# Patient Record
Sex: FEMALE | Race: WHITE | Employment: FULL TIME | URBAN - METROPOLITAN AREA
[De-identification: names, ages, dates, MRNs, and addresses within clinical notes are randomized per-mention and may not be internally consistent; named-entity substitution may affect disease eponyms.]

---

## 2018-04-30 ENCOUNTER — OFFICE VISIT (OUTPATIENT)
Dept: ORTHOPEDIC SURGERY | Age: 63
End: 2018-04-30

## 2018-04-30 VITALS
HEIGHT: 64 IN | HEART RATE: 97 BPM | BODY MASS INDEX: 29.02 KG/M2 | SYSTOLIC BLOOD PRESSURE: 124 MMHG | DIASTOLIC BLOOD PRESSURE: 83 MMHG | WEIGHT: 170 LBS

## 2018-04-30 DIAGNOSIS — M67.951 TENDINOPATHY OF RIGHT GLUTEUS MEDIUS: ICD-10-CM

## 2018-04-30 DIAGNOSIS — S82.842A BIMALLEOLAR ANKLE FRACTURE, LEFT, CLOSED, INITIAL ENCOUNTER: Primary | ICD-10-CM

## 2018-04-30 PROCEDURE — 99204 OFFICE O/P NEW MOD 45 MIN: CPT | Performed by: ORTHOPAEDIC SURGERY

## 2018-04-30 RX ORDER — TRAMADOL HYDROCHLORIDE 50 MG/1
50 TABLET ORAL EVERY 4 HOURS PRN
Qty: 42 TABLET | Refills: 0 | Status: SHIPPED | OUTPATIENT
Start: 2018-04-30 | End: 2018-05-09 | Stop reason: HOSPADM

## 2018-05-01 ENCOUNTER — TELEPHONE (OUTPATIENT)
Dept: ORTHOPEDIC SURGERY | Age: 63
End: 2018-05-01

## 2018-05-03 ENCOUNTER — TELEPHONE (OUTPATIENT)
Dept: ORTHOPEDIC SURGERY | Age: 63
End: 2018-05-03

## 2018-05-07 ENCOUNTER — TELEPHONE (OUTPATIENT)
Dept: ORTHOPEDIC SURGERY | Age: 63
End: 2018-05-07

## 2018-05-07 ASSESSMENT — PAIN DESCRIPTION - DESCRIPTORS: DESCRIPTORS: SHARP;DULL

## 2018-05-07 ASSESSMENT — PAIN - FUNCTIONAL ASSESSMENT: PAIN_FUNCTIONAL_ASSESSMENT: 0-10

## 2018-05-07 ASSESSMENT — PAIN SCALES - GENERAL: PAINLEVEL_OUTOF10: 1

## 2018-05-07 ASSESSMENT — PAIN DESCRIPTION - LOCATION: LOCATION: ANKLE

## 2018-05-07 ASSESSMENT — PAIN DESCRIPTION - PAIN TYPE: TYPE: ACUTE PAIN

## 2018-05-07 ASSESSMENT — PAIN DESCRIPTION - ORIENTATION: ORIENTATION: LEFT

## 2018-05-09 ENCOUNTER — HOSPITAL ENCOUNTER (OUTPATIENT)
Dept: PREADMISSION TESTING | Age: 63
Discharge: HOME | End: 2018-05-09
Attending: ORTHOPAEDIC SURGERY | Admitting: ORTHOPAEDIC SURGERY

## 2018-05-09 VITALS
HEIGHT: 64 IN | WEIGHT: 170 LBS | RESPIRATION RATE: 16 BRPM | TEMPERATURE: 98.9 F | OXYGEN SATURATION: 97 % | HEART RATE: 95 BPM | SYSTOLIC BLOOD PRESSURE: 123 MMHG | DIASTOLIC BLOOD PRESSURE: 82 MMHG | BODY MASS INDEX: 29.02 KG/M2

## 2018-05-09 DIAGNOSIS — Z87.81 STATUS POST ORIF OF FRACTURE OF ANKLE: ICD-10-CM

## 2018-05-09 DIAGNOSIS — G89.18 POST-OP PAIN: Primary | ICD-10-CM

## 2018-05-09 DIAGNOSIS — Z98.890 STATUS POST ORIF OF FRACTURE OF ANKLE: ICD-10-CM

## 2018-05-09 DIAGNOSIS — S82.843A CLOSED BIMALLEOLAR FRACTURE, UNSPECIFIED LATERALITY, INITIAL ENCOUNTER: ICD-10-CM

## 2018-05-09 LAB
ABO/RH: NORMAL
ANTIBODY SCREEN: NORMAL

## 2018-05-09 RX ORDER — ASPIRIN 81 MG/1
81 TABLET ORAL 2 TIMES DAILY
Qty: 28 TABLET | Refills: 0 | Status: SHIPPED | OUTPATIENT
Start: 2018-05-09 | End: 2018-05-30

## 2018-05-09 RX ORDER — KETOROLAC TROMETHAMINE 30 MG/ML
30 INJECTION, SOLUTION INTRAMUSCULAR; INTRAVENOUS ONCE
Status: COMPLETED | OUTPATIENT
Start: 2018-05-09 | End: 2018-05-09

## 2018-05-09 RX ORDER — LABETALOL HYDROCHLORIDE 5 MG/ML
5 INJECTION, SOLUTION INTRAVENOUS EVERY 10 MIN PRN
Status: DISCONTINUED | OUTPATIENT
Start: 2018-05-09 | End: 2018-05-10 | Stop reason: HOSPADM

## 2018-05-09 RX ORDER — DOCUSATE SODIUM 100 MG/1
100 CAPSULE, LIQUID FILLED ORAL 2 TIMES DAILY PRN
Qty: 28 CAPSULE | Refills: 0 | Status: SHIPPED | OUTPATIENT
Start: 2018-05-09 | End: 2018-05-30

## 2018-05-09 RX ORDER — ACETAMINOPHEN 500 MG
1000 TABLET ORAL ONCE
Status: COMPLETED | OUTPATIENT
Start: 2018-05-09 | End: 2018-05-09

## 2018-05-09 RX ORDER — HYDROCODONE BITARTRATE AND ACETAMINOPHEN 5; 325 MG/1; MG/1
2 TABLET ORAL EVERY 4 HOURS PRN
Status: DISCONTINUED | OUTPATIENT
Start: 2018-05-09 | End: 2018-05-10 | Stop reason: HOSPADM

## 2018-05-09 RX ORDER — SCOLOPAMINE TRANSDERMAL SYSTEM 1 MG/1
1 PATCH, EXTENDED RELEASE TRANSDERMAL ONCE
Status: DISCONTINUED | OUTPATIENT
Start: 2018-05-09 | End: 2018-05-10 | Stop reason: HOSPADM

## 2018-05-09 RX ORDER — SODIUM CHLORIDE 0.9 % (FLUSH) 0.9 %
10 SYRINGE (ML) INJECTION EVERY 12 HOURS SCHEDULED
Status: DISCONTINUED | OUTPATIENT
Start: 2018-05-09 | End: 2018-05-10 | Stop reason: HOSPADM

## 2018-05-09 RX ORDER — MORPHINE SULFATE 2 MG/ML
2 INJECTION, SOLUTION INTRAMUSCULAR; INTRAVENOUS EVERY 5 MIN PRN
Status: DISCONTINUED | OUTPATIENT
Start: 2018-05-09 | End: 2018-05-10 | Stop reason: HOSPADM

## 2018-05-09 RX ORDER — FENTANYL CITRATE 50 UG/ML
50 INJECTION, SOLUTION INTRAMUSCULAR; INTRAVENOUS EVERY 5 MIN PRN
Status: DISCONTINUED | OUTPATIENT
Start: 2018-05-09 | End: 2018-05-10 | Stop reason: HOSPADM

## 2018-05-09 RX ORDER — ROPIVACAINE HYDROCHLORIDE 5 MG/ML
INJECTION, SOLUTION EPIDURAL; INFILTRATION; PERINEURAL
Status: DISPENSED
Start: 2018-05-09 | End: 2018-05-09

## 2018-05-09 RX ORDER — HYDROCODONE BITARTRATE AND ACETAMINOPHEN 5; 325 MG/1; MG/1
1 TABLET ORAL EVERY 4 HOURS PRN
Status: DISCONTINUED | OUTPATIENT
Start: 2018-05-09 | End: 2018-05-10 | Stop reason: HOSPADM

## 2018-05-09 RX ORDER — KETOROLAC TROMETHAMINE 30 MG/ML
INJECTION, SOLUTION INTRAMUSCULAR; INTRAVENOUS
Status: DISCONTINUED
Start: 2018-05-09 | End: 2018-05-10 | Stop reason: HOSPADM

## 2018-05-09 RX ORDER — SODIUM CHLORIDE, SODIUM LACTATE, POTASSIUM CHLORIDE, CALCIUM CHLORIDE 600; 310; 30; 20 MG/100ML; MG/100ML; MG/100ML; MG/100ML
INJECTION, SOLUTION INTRAVENOUS CONTINUOUS
Status: DISCONTINUED | OUTPATIENT
Start: 2018-05-09 | End: 2018-05-10 | Stop reason: HOSPADM

## 2018-05-09 RX ORDER — SODIUM CHLORIDE 0.9 % (FLUSH) 0.9 %
10 SYRINGE (ML) INJECTION PRN
Status: DISCONTINUED | OUTPATIENT
Start: 2018-05-09 | End: 2018-05-10 | Stop reason: HOSPADM

## 2018-05-09 RX ORDER — FENTANYL CITRATE 50 UG/ML
100 INJECTION, SOLUTION INTRAMUSCULAR; INTRAVENOUS ONCE
Status: COMPLETED | OUTPATIENT
Start: 2018-05-09 | End: 2018-05-09

## 2018-05-09 RX ORDER — ONDANSETRON 2 MG/ML
4 INJECTION INTRAMUSCULAR; INTRAVENOUS
Status: ACTIVE | OUTPATIENT
Start: 2018-05-09 | End: 2018-05-09

## 2018-05-09 RX ORDER — HYDROCODONE BITARTRATE AND ACETAMINOPHEN 5; 325 MG/1; MG/1
1 TABLET ORAL EVERY 6 HOURS PRN
Qty: 28 TABLET | Refills: 0 | Status: SHIPPED | OUTPATIENT
Start: 2018-05-09 | End: 2018-05-16

## 2018-05-09 RX ORDER — MEPERIDINE HYDROCHLORIDE 25 MG/ML
12.5 INJECTION INTRAMUSCULAR; INTRAVENOUS; SUBCUTANEOUS EVERY 5 MIN PRN
Status: DISCONTINUED | OUTPATIENT
Start: 2018-05-09 | End: 2018-05-10 | Stop reason: HOSPADM

## 2018-05-09 RX ORDER — ONDANSETRON 2 MG/ML
4 INJECTION INTRAMUSCULAR; INTRAVENOUS EVERY 6 HOURS PRN
Status: DISCONTINUED | OUTPATIENT
Start: 2018-05-09 | End: 2018-05-10 | Stop reason: HOSPADM

## 2018-05-09 RX ORDER — ACETAMINOPHEN 325 MG/1
650 TABLET ORAL EVERY 4 HOURS PRN
Status: DISCONTINUED | OUTPATIENT
Start: 2018-05-09 | End: 2018-05-10 | Stop reason: HOSPADM

## 2018-05-09 RX ORDER — DOCUSATE SODIUM 100 MG/1
100 CAPSULE, LIQUID FILLED ORAL 2 TIMES DAILY
Status: DISCONTINUED | OUTPATIENT
Start: 2018-05-09 | End: 2018-05-10 | Stop reason: HOSPADM

## 2018-05-09 RX ORDER — MIDAZOLAM HYDROCHLORIDE 1 MG/ML
2 INJECTION INTRAMUSCULAR; INTRAVENOUS
Status: COMPLETED | OUTPATIENT
Start: 2018-05-09 | End: 2018-05-09

## 2018-05-09 RX ORDER — OXYCODONE HCL 20 MG/1
20 TABLET, FILM COATED, EXTENDED RELEASE ORAL ONCE
Status: COMPLETED | OUTPATIENT
Start: 2018-05-09 | End: 2018-05-09

## 2018-05-09 RX ADMIN — MIDAZOLAM HYDROCHLORIDE 2 MG: 1 INJECTION INTRAMUSCULAR; INTRAVENOUS at 07:45

## 2018-05-09 RX ADMIN — FENTANYL CITRATE 50 MCG: 50 INJECTION, SOLUTION INTRAMUSCULAR; INTRAVENOUS at 07:46

## 2018-05-09 RX ADMIN — OXYCODONE HCL 20 MG: 20 TABLET, FILM COATED, EXTENDED RELEASE ORAL at 07:19

## 2018-05-09 RX ADMIN — Medication 1000 MG: at 07:19

## 2018-05-09 RX ADMIN — SODIUM CHLORIDE, SODIUM LACTATE, POTASSIUM CHLORIDE, CALCIUM CHLORIDE: 600; 310; 30; 20 INJECTION, SOLUTION INTRAVENOUS at 07:18

## 2018-05-09 RX ADMIN — HYDROCODONE BITARTRATE AND ACETAMINOPHEN 2 TABLET: 5; 325 TABLET ORAL at 12:52

## 2018-05-09 RX ADMIN — FENTANYL CITRATE 50 MCG: 50 INJECTION, SOLUTION INTRAMUSCULAR; INTRAVENOUS at 12:54

## 2018-05-09 RX ADMIN — MORPHINE SULFATE 2 MG: 2 INJECTION, SOLUTION INTRAMUSCULAR; INTRAVENOUS at 12:18

## 2018-05-09 RX ADMIN — KETOROLAC TROMETHAMINE 30 MG: 30 INJECTION, SOLUTION INTRAMUSCULAR; INTRAVENOUS at 12:37

## 2018-05-09 RX ADMIN — HYDROCODONE BITARTRATE AND ACETAMINOPHEN 2 TABLET: 5; 325 TABLET ORAL at 16:40

## 2018-05-09 ASSESSMENT — PAIN - FUNCTIONAL ASSESSMENT: PAIN_FUNCTIONAL_ASSESSMENT: 0-10

## 2018-05-09 ASSESSMENT — PAIN SCALES - GENERAL
PAINLEVEL_OUTOF10: 4
PAINLEVEL_OUTOF10: 8
PAINLEVEL_OUTOF10: 7
PAINLEVEL_OUTOF10: 8
PAINLEVEL_OUTOF10: 7
PAINLEVEL_OUTOF10: 8
PAINLEVEL_OUTOF10: 8

## 2018-05-09 ASSESSMENT — PAIN DESCRIPTION - PAIN TYPE
TYPE: SURGICAL PAIN

## 2018-05-09 ASSESSMENT — PAIN DESCRIPTION - ORIENTATION
ORIENTATION: LEFT

## 2018-05-09 ASSESSMENT — PAIN DESCRIPTION - DESCRIPTORS
DESCRIPTORS: SHARP
DESCRIPTORS: SHARP;OTHER (COMMENT)
DESCRIPTORS: ACHING

## 2018-05-09 ASSESSMENT — PAIN DESCRIPTION - PROGRESSION: CLINICAL_PROGRESSION: GRADUALLY IMPROVING

## 2018-05-09 ASSESSMENT — PAIN DESCRIPTION - LOCATION
LOCATION: ANKLE;OTHER (COMMENT)
LOCATION: OTHER (COMMENT)
LOCATION: OTHER (COMMENT)

## 2018-05-09 ASSESSMENT — PAIN DESCRIPTION - FREQUENCY: FREQUENCY: CONTINUOUS

## 2018-05-10 ENCOUNTER — TELEPHONE (OUTPATIENT)
Dept: ORTHOPEDIC SURGERY | Age: 63
End: 2018-05-10

## 2018-05-14 ENCOUNTER — TELEPHONE (OUTPATIENT)
Dept: ORTHOPEDIC SURGERY | Age: 63
End: 2018-05-14

## 2018-05-15 ENCOUNTER — TELEPHONE (OUTPATIENT)
Dept: ORTHOPEDIC SURGERY | Age: 63
End: 2018-05-15

## 2018-05-21 ENCOUNTER — PRE-EVALUATION (OUTPATIENT)
Dept: ORTHOPEDIC SURGERY | Age: 63
End: 2018-05-21

## 2018-05-21 ENCOUNTER — OFFICE VISIT (OUTPATIENT)
Dept: ORTHOPEDIC SURGERY | Age: 63
End: 2018-05-21

## 2018-05-21 VITALS
DIASTOLIC BLOOD PRESSURE: 77 MMHG | HEART RATE: 92 BPM | BODY MASS INDEX: 29.02 KG/M2 | WEIGHT: 169.97 LBS | HEIGHT: 64 IN | SYSTOLIC BLOOD PRESSURE: 119 MMHG

## 2018-05-21 DIAGNOSIS — Z98.890 STATUS POST ORIF OF FRACTURE OF ANKLE: ICD-10-CM

## 2018-05-21 DIAGNOSIS — Z87.81 STATUS POST ORIF OF FRACTURE OF ANKLE: Primary | ICD-10-CM

## 2018-05-21 DIAGNOSIS — M25.572 LEFT ANKLE PAIN, UNSPECIFIED CHRONICITY: Primary | ICD-10-CM

## 2018-05-21 DIAGNOSIS — Z87.81 STATUS POST ORIF OF FRACTURE OF ANKLE: ICD-10-CM

## 2018-05-21 DIAGNOSIS — Z98.890 STATUS POST ORIF OF FRACTURE OF ANKLE: Primary | ICD-10-CM

## 2018-05-21 PROCEDURE — L4361 PNEUMA/VAC WALK BOOT PRE OTS: HCPCS | Performed by: ORTHOPAEDIC SURGERY

## 2018-05-21 PROCEDURE — APPSS15 APP SPLIT SHARED TIME 0-15 MINUTES: Performed by: PHYSICIAN ASSISTANT

## 2018-05-21 PROCEDURE — 99024 POSTOP FOLLOW-UP VISIT: CPT | Performed by: ORTHOPAEDIC SURGERY

## 2018-05-24 ENCOUNTER — TELEPHONE (OUTPATIENT)
Dept: ORTHOPEDIC SURGERY | Age: 63
End: 2018-05-24

## 2018-05-30 ENCOUNTER — OFFICE VISIT (OUTPATIENT)
Dept: ORTHOPEDIC SURGERY | Age: 63
End: 2018-05-30

## 2018-05-30 VITALS
HEIGHT: 64 IN | SYSTOLIC BLOOD PRESSURE: 133 MMHG | DIASTOLIC BLOOD PRESSURE: 85 MMHG | WEIGHT: 169 LBS | BODY MASS INDEX: 28.85 KG/M2 | HEART RATE: 103 BPM

## 2018-05-30 DIAGNOSIS — Z87.81 STATUS POST ORIF OF FRACTURE OF ANKLE: Primary | ICD-10-CM

## 2018-05-30 DIAGNOSIS — Z98.890 STATUS POST ORIF OF FRACTURE OF ANKLE: Primary | ICD-10-CM

## 2018-05-30 PROCEDURE — 99024 POSTOP FOLLOW-UP VISIT: CPT | Performed by: PHYSICIAN ASSISTANT

## 2018-06-11 ENCOUNTER — TELEPHONE (OUTPATIENT)
Dept: ORTHOPEDIC SURGERY | Age: 63
End: 2018-06-11

## 2018-06-19 ENCOUNTER — OFFICE VISIT (OUTPATIENT)
Dept: ORTHOPEDIC SURGERY | Age: 63
End: 2018-06-19

## 2018-06-19 VITALS
WEIGHT: 169 LBS | DIASTOLIC BLOOD PRESSURE: 79 MMHG | HEIGHT: 64 IN | HEART RATE: 86 BPM | SYSTOLIC BLOOD PRESSURE: 121 MMHG | BODY MASS INDEX: 28.85 KG/M2

## 2018-06-19 DIAGNOSIS — Z87.81 STATUS POST OPEN REDUCTION WITH INTERNAL FIXATION (ORIF) OF FRACTURE OF ANKLE: ICD-10-CM

## 2018-06-19 DIAGNOSIS — Z98.890 STATUS POST OPEN REDUCTION WITH INTERNAL FIXATION (ORIF) OF FRACTURE OF ANKLE: ICD-10-CM

## 2018-06-19 DIAGNOSIS — M25.572 LEFT ANKLE PAIN, UNSPECIFIED CHRONICITY: Primary | ICD-10-CM

## 2018-06-19 PROCEDURE — 99024 POSTOP FOLLOW-UP VISIT: CPT | Performed by: ORTHOPAEDIC SURGERY

## 2018-07-09 ENCOUNTER — OFFICE VISIT (OUTPATIENT)
Dept: ORTHOPEDIC SURGERY | Age: 63
End: 2018-07-09

## 2018-07-09 VITALS
SYSTOLIC BLOOD PRESSURE: 118 MMHG | WEIGHT: 169 LBS | DIASTOLIC BLOOD PRESSURE: 77 MMHG | HEIGHT: 64 IN | HEART RATE: 80 BPM | BODY MASS INDEX: 28.85 KG/M2

## 2018-07-09 DIAGNOSIS — M25.572 LEFT ANKLE PAIN, UNSPECIFIED CHRONICITY: Primary | ICD-10-CM

## 2018-07-09 DIAGNOSIS — Z98.890 STATUS POST OPEN REDUCTION WITH INTERNAL FIXATION (ORIF) OF FRACTURE OF ANKLE: ICD-10-CM

## 2018-07-09 DIAGNOSIS — Z87.81 STATUS POST OPEN REDUCTION WITH INTERNAL FIXATION (ORIF) OF FRACTURE OF ANKLE: ICD-10-CM

## 2018-07-09 PROCEDURE — 99024 POSTOP FOLLOW-UP VISIT: CPT | Performed by: ORTHOPAEDIC SURGERY

## 2018-07-12 ENCOUNTER — TELEPHONE (OUTPATIENT)
Dept: ORTHOPEDIC SURGERY | Age: 63
End: 2018-07-12

## 2018-07-12 NOTE — TELEPHONE ENCOUNTER
Insurance will not pay for PT and pt states Dr. Jaimie Schultz said he would intervene to get the insurance company to pay. She is needing the doctor's help.

## 2018-07-17 ENCOUNTER — HOSPITAL ENCOUNTER (OUTPATIENT)
Dept: PHYSICAL THERAPY | Age: 63
Discharge: HOME | End: 2018-07-31
Admitting: ORTHOPAEDIC SURGERY

## 2018-07-17 NOTE — PLAN OF CARE
her ankle feels better now that she is able to put weight down, but is still limping. Relevant Medical History: Pt denies any previous injuries/surgeries to L LE  Functional Disability Index:PT G-Codes  Functional Assessment Tool Used: LEFS  Score: 66%  Functional Limitation: Mobility: Walking and moving around  Mobility: Walking and Moving Around Current Status (): At least 60 percent but less than 80 percent impaired, limited or restricted  Mobility: Walking and Moving Around Goal Status (): At least 20 percent but less than 40 percent impaired, limited or restricted    Pain Scale: 0-10/10  Easing factors: boot, rest, ice, elevation  Provocative factors: walking, standing, going up/down steps     Type: []Constant   [x]Intermittent  []Radiating [x]Localized []other:     Numbness/Tingling: Pt reports having occasional N/T into toes    Occupation/School: Pt works as /desk job. Pt is currently on light duty (slightly different position in a different office building) because she needs to be able to walk back and forth quite a bit in the office, and she has 14 concrete steps down to her office with no elevator. Living Status/Prior Level of Function: Independent with ADLs and IADLs. Pt enjoys walking and kayaking.      OBJECTIVE:     ROM LEFT RIGHT   HIP Flex     HIP Abd     HIP Ext     HIP IR     HIP ER     Knee ext     Knee Flex     Ankle PF 20 40   Ankle DF -10 8   Ankle In 15 30   Ankle Ev 10 20   Strength  LEFT RIGHT   HIP Flexors     HIP Abductors     HIP Ext     Hip ER     Knee EXT (quad)     Knee Flex (HS)     Ankle DF NT 5   Ankle PF NT 5   Ankle Inv NT 5   Ankle EV NT 5        Circumference  Figure 8 ankle     39 cm    37.75 cm     Reflexes/Sensation:    [x]Dermatomes/Myotomes intact    [x]Reflexes equal and normal bilaterally   []Other:    Joint mobility: Talocrural   []Normal    [x]Hypo   []Hyper    Palpation: Generalized TTP due to recent sx    Functional Mobility/Transfers: Pt barriers              [x]Environmental, home barriers              [x]profession/work barriers  []past PT/medical experience  [x]other: insurance limitations  Justification:     Falls Risk Assessment (30 days):   [x] Falls Risk assessed and no intervention required. [] Falls Risk assessed and Patient requires intervention due to being higher risk   TUG score (>12s at risk):     [] Falls education provided, including       G-Codes:  PT G-Codes  Functional Assessment Tool Used: LEFS  Score: 66%  Functional Limitation: Mobility: Walking and moving around  Mobility: Walking and Moving Around Current Status (): At least 60 percent but less than 80 percent impaired, limited or restricted  Mobility: Walking and Moving Around Goal Status ():  At least 20 percent but less than 40 percent impaired, limited or restricted    ASSESSMENT:   Functional Impairments:     [x]Noted lumbar/proximal hip/LE joint hypomobility   [x]Decreased LE functional ROM   [x]Decreased core/proximal hip strength and neuromuscular control   [x]Decreased LE functional strength   [x]Reduced balance/proprioceptive control   []other:      Functional Activity Limitations (from functional questionnaire and intake)   []Reduced ability to tolerate prolonged functional positions   [x]Reduced ability or difficulty with changes of positions or transfers between positions   []Reduced ability to maintain good posture and demonstrate good body mechanics with sitting, bending, and lifting   []Reduced ability to sleep   [] Reduced ability or tolerance with driving and/or computer work   []Reduced ability to perform lifting, carrying tasks   [x]Reduced ability to squat   []Reduced ability to forward bend   [x]Reduced ability to ambulate prolonged functional periods/distances/surfaces   [x]Reduced ability to ascend/descend stairs   [x]Reduced ability to run, hop, cut or jump   []other:    Participation Restrictions   []Reduced participation in self care assessment of functional outcome. [x] EVAL (LOW) 30945 (typically 30 minutes face-to-face)  [] EVAL (MOD) 71590 (typically 30 minutes face-to-face)  [] EVAL (HIGH) 01449 (typically 45 minutes face-to-face)  [] RE-EVAL     PLAN:   Frequency/Duration:  1 days per week for 8-10 Weeks (due to insurance limitations): Interventions:  [x]  Therapeutic exercise including: strength training, ROM, for Lower extremity and core   [x]  NMR activation and proprioception for LE, Glutes and Core   [x]  Manual therapy as indicated for LE, Hip and spine to include: Dry Needling/IASTM, STM, PROM, Gr I-IV mobilizations, manipulation. [x] Modalities as needed that may include: thermal agents, E-stim, Biofeedback, US, iontophoresis as indicated  [x] Patient education on joint protection, postural re-education, activity modification, progression of HEP. HEP instruction: Patient instructed in, and demonstrated proper form of, exercises. Copy of exercises scanned into media file. GOALS:  Patient stated goal: \"To be able to walk better\"     Therapist goals for Patient:   Short Term Goals: To be achieved in: 2 weeks  1. Independent in HEP and progression per patient tolerance, in order to prevent re-injury. 2. Patient will have a decrease in pain to facilitate improvement in movement, function, and ADLs as indicated by Functional Deficits. Long Term Goals: To be achieved in: 8-10 weeks  1. Disability index score of 40% or less for the LEFS to assist with reaching prior level of function. 2. Patient will demonstrate increased AROM to WNL for L ankle to allow for proper joint functioning as indicated by patients Functional Deficits. 3. Patient will demonstrate an increase in Strength to good proximal hip strength and control, within 5lb HHD in LE to allow for proper functional mobility as indicated by patients Functional Deficits.    4. Patient will return to walking up and down 1 flight of stairs without increased symptoms or restriction in order to return to normal work duties. 5. Patient will be able to walk for 30 minutes without increased symptoms or restriction.         Electronically signed by:  Valarie Vail PT

## 2018-07-17 NOTE — FLOWSHEET NOTE
[facility] [address], [facility]    Physical Therapy Daily Treatment Note  Date:  2018    Patient Name:  Roopa Matute    :  1955  MRN: [id number]  Restrictions/Precautions:    Medical/Treatment Diagnosis Information:  · Diagnosis: Left ankle pain (M25.572), s/p ORIF of medial and lateral malleolus (Z96.7) 18  · Treatment Diagnosis: Left ankle pain   Insurance/Certification information:  PT Insurance Information: Self pay  Physician Information:  Referring Practitioner: Dr. Jammie Dillard of care signed (Y/N):     Date of Patient follow up with Physician:     G-Code (if applicable):      Date G-Code Applied:    PT G-Codes  Functional Assessment Tool Used: LEFS  Score: 66%  Functional Limitation: Mobility: Walking and moving around  Mobility: Walking and Moving Around Current Status (): At least 60 percent but less than 80 percent impaired, limited or restricted  Mobility: Walking and Moving Around Goal Status ():  At least 20 percent but less than 40 percent impaired, limited or restricted    Progress Note: [x]  Yes  []  No  Next due by: Visit #10       Latex Allergy:  [x]NO      []YES  Preferred Language for Healthcare:   [x]English       []other:    Visit # Insurance Allowable   1 Self-pay     Pain level:  0-10/10     SUBJECTIVE:  See eval    OBJECTIVE: See eval  Observation:   Test measurements:      RESTRICTIONS/PRECAUTIONS: S/p R ankle ORIF medial & lateral malleolus 18     Exercises/Interventions:     Therapeutic Ex Resistance Sets/sec Reps Notes   Retro Stepper/BIKE       Sportcord March       SLR flex/abd  2 10    SAQ       Clam ABD       Hip Ext /table       Bosu fwd/side lunge       Slide Lunge       Leg Press Ecc 0-       Cybex HS curl       TKE       Glute side walks       BOSU squat       Seated calf stretch  30\" 3    Ankle AROM  2 10 DF/PF, IV/EV   Ankle circles  2 10 CW, CCW   Ankle ABC's  1 2x    Towel scrunches  2 10 Towards Functional goals:  [] Patient is progressing as expected towards functional goals listed. [] Progression is slowed due to complexities listed. [] Progression has been slowed due to co-morbidities.   [x] Plan just implemented, too soon to assess goals progression  [] Other:     ASSESSMENT:  See eval    Treatment/Activity Tolerance:  [x] Patient tolerated treatment well [] Patient limited by fatique  [] Patient limited by pain  [] Patient limited by other medical complications  [] Other:     Prognosis: [x] Good [] Fair  [] Poor    Patient Requires Follow-up: [x] Yes  [] No    PLAN: See eval  [] Continue per plan of care [] Alter current plan (see comments)  [x] Plan of care initiated [] Hold pending MD visit [] Discharge    Electronically signed by: Denver Bunk PT

## 2018-07-26 ENCOUNTER — HOSPITAL ENCOUNTER (OUTPATIENT)
Dept: PHYSICAL THERAPY | Age: 63
Discharge: HOME | End: 2018-07-27
Admitting: ORTHOPAEDIC SURGERY

## 2018-07-26 NOTE — FLOWSHEET NOTE
Oscillations-Mobs:  G-I, II, III, IV (PA's, Inf., Post.)  [] (19136) Provided manual therapy to mobilize LE, proximal hip and/or LS spine soft tissue/joints for the purpose of modulating pain, promoting relaxation,  increasing ROM, reducing/eliminating soft tissue swelling/inflammation/restriction, improving soft tissue extensibility and allowing for proper ROM for normal function with self care, mobility, lifting and ambulation. Modalities:  15' vaso    Charges:  Timed Code Treatment Minutes: 22   Total Treatment Minutes: 22     [] EVAL  [] VU(34064) x      [] IONTO  [x] NMR (23385) x      [] VASO  [] Manual (39887) x       [] Other:  [] TA x       [] Mech Traction (41221)  [] ES(attended) (73182)      [] ES (un) (81088):     GOALS:  Patient stated goal: \"To be able to walk better\"      Therapist goals for Patient:   Short Term Goals: To be achieved in: 2 weeks  1. Independent in HEP and progression per patient tolerance, in order to prevent re-injury. 2. Patient will have a decrease in pain to facilitate improvement in movement, function, and ADLs as indicated by Functional Deficits.     Long Term Goals: To be achieved in: 8-10 weeks  1. Disability index score of 40% or less for the LEFS to assist with reaching prior level of function. 2. Patient will demonstrate increased AROM to WNL for L ankle to allow for proper joint functioning as indicated by patients Functional Deficits. 3. Patient will demonstrate an increase in Strength to good proximal hip strength and control, within 5lb HHD in LE to allow for proper functional mobility as indicated by patients Functional Deficits. 4. Patient will return to walking up and down 1 flight of stairs without increased symptoms or restriction in order to return to normal work duties. 5. Patient will be able to walk for 30 minutes without increased symptoms or restriction.         Progression Towards Functional goals:  [x] Patient is progressing as expected

## 2018-07-31 ENCOUNTER — OFFICE VISIT (OUTPATIENT)
Dept: ORTHOPEDIC SURGERY | Age: 63
End: 2018-07-31

## 2018-07-31 VITALS
BODY MASS INDEX: 28.85 KG/M2 | HEIGHT: 64 IN | SYSTOLIC BLOOD PRESSURE: 107 MMHG | WEIGHT: 169 LBS | HEART RATE: 80 BPM | DIASTOLIC BLOOD PRESSURE: 74 MMHG

## 2018-07-31 DIAGNOSIS — M25.572 LEFT ANKLE PAIN, UNSPECIFIED CHRONICITY: Primary | ICD-10-CM

## 2018-07-31 DIAGNOSIS — S82.892D CLOSED FRACTURE OF LEFT ANKLE WITH ROUTINE HEALING, SUBSEQUENT ENCOUNTER: ICD-10-CM

## 2018-07-31 PROCEDURE — 99024 POSTOP FOLLOW-UP VISIT: CPT | Performed by: ORTHOPAEDIC SURGERY

## 2018-07-31 NOTE — PROGRESS NOTES
If so, please bring any errors to my attention for an addendum. All efforts were made to ensure that this office note is accurate.

## 2018-08-01 ENCOUNTER — HOSPITAL ENCOUNTER (OUTPATIENT)
Dept: PHYSICAL THERAPY | Age: 63
Discharge: HOME | End: 2018-08-31
Attending: ORTHOPAEDIC SURGERY | Admitting: ORTHOPAEDIC SURGERY

## 2018-08-02 ENCOUNTER — HOSPITAL ENCOUNTER (OUTPATIENT)
Dept: PHYSICAL THERAPY | Age: 63
Discharge: HOME | End: 2018-08-03
Admitting: ORTHOPAEDIC SURGERY

## 2018-08-02 NOTE — FLOWSHEET NOTE
[facility] , [facility]    Physical Therapy Daily Treatment Note  Date:  2018    Patient Name:  Heri Morataya    :  1955  MRN: [id number]  Restrictions/Precautions:    Medical/Treatment Diagnosis Information:  · Diagnosis: Left ankle pain (M25.572), s/p ORIF of medial and lateral malleolus (Z96.7) 18  · Treatment Diagnosis: Left ankle pain   Insurance/Certification information:  PT Insurance Information: Self pay  Physician Information:  Referring Practitioner: Dr. Wisam Perez of care signed (Y/N):     Date of Patient follow up with Physician:     G-Code (if applicable):      Date G-Code Applied:         Progress Note: [x]  Yes  []  No  Next due by: Visit #10       Latex Allergy:  [x]NO      []YES  Preferred Language for Healthcare:   [x]English       []other:    Visit # Insurance Allowable   1 Self-pay     Pain level:  0-10/10     SUBJECTIVE:  Has felt really good recently in the foot and ankle. Says that she has started to have increased pain in the anterior aspect of the knee due to the boot. Says that Dr. Joselito Braden said she can start to wean out of the boot in 2-3 weeks.      OBJECTIVE: See eval  Observation:   Test measurements:      RESTRICTIONS/PRECAUTIONS: S/p R ankle ORIF medial & lateral malleolus 18     Exercises/Interventions:     Therapeutic Ex Resistance Sets/sec Reps Notes   Retro Stepper/BIKE       Sportcord March       SLR flex/abd  2 10    SAQ       Clam ABD       Hip Ext /table       Bosu fwd/side lunge       Slide Lunge       Leg Press Ecc 0-       Cybex HS curl       TKE       Glute side walks       TM push  3'     Standing calf stretch  3'     Ankle AROM For proprioception and control 2 10 DF/PF, IV/EV   Ankle circles For proprioception and control 2 10 CW, CCW   Ankle ABC's  1 2x    Towel scrunches  2 10    Ankle Tband Blue 1 20                  Manual Intervention       Knee mobs/PROM       Tib/Fem Mobs

## 2018-08-07 ENCOUNTER — HOSPITAL ENCOUNTER (OUTPATIENT)
Dept: PHYSICAL THERAPY | Age: 63
Discharge: HOME | End: 2018-08-08
Admitting: ORTHOPAEDIC SURGERY

## 2018-08-07 NOTE — FLOWSHEET NOTE
[facility] , [facility]    Physical Therapy Daily Treatment Note  Date:  2018    Patient Name:  Anselm Sicard    :  1955  MRN: [id number]  Restrictions/Precautions:    Medical/Treatment Diagnosis Information:  · Diagnosis: Left ankle pain (M25.572), s/p ORIF of medial and lateral malleolus (Z96.7) 18  · Treatment Diagnosis: Left ankle pain   Insurance/Certification information:  PT Insurance Information: Self pay  Physician Information:  Referring Practitioner: Dr. Norbert Billings of care signed (Y/N):     Date of Patient follow up with Physician:     G-Code (if applicable):      Date G-Code Applied:         Progress Note: [x]  Yes  []  No  Next due by: Visit #10       Latex Allergy:  [x]NO      []YES  Preferred Language for Healthcare:   [x]English       []other:    Visit # Insurance Allowable   4 Self-pay     Pain level:  0-10/10     SUBJECTIVE:  Says that she has still felt pretty good overall, Says that she has noted some increased soreness in the front of the foot and says that she has been using the ankle more.     OBJECTIVE: See eval  Observation:   Test measurements:      RESTRICTIONS/PRECAUTIONS: S/p R ankle ORIF medial & lateral malleolus 18     Exercises/Interventions:     Therapeutic Ex Resistance Sets/sec Reps Notes   Retro Stepper/BIKE       Sportcord March       SLR flex/abd  2 10    SAQ       Clam ABD       Hip Ext /table       Bosu fwd/side lunge       Slide Lunge       Leg Press Ecc 0-       Cybex HS curl       TKE       Glute side walks       TM push  3'     Standing calf stretch  3'     Ankle AROM For proprioception and control 2 10 DF/PF, IV/EV   Ankle circles For proprioception and control 2 10 CW, CCW   Ankle ABC's  1 2x    Towel scrunches  2 10    Ankle Tband Blue 1 20                  Manual Intervention       Knee mobs/PROM       Tib/Fem Mobs       Patella Mobs       Ankle mobs/PROM 5' Greenlandic/Biofeedback 10/10       G. Med activaiton/sidelying       G. Max Activation/prone       Hip Ext full ROM G. Activation       Bosu Bal and Prop- G Med       Single leg stance/Balance/Prop       Bosu Retro G. Med act                         Therapeutic Exercise and NMR EXR  [x] (34045) Provided verbal/tactile cueing for activities related to strengthening, flexibility, endurance, ROM for improvements in LE, proximal hip, and core control with self care, mobility, lifting, ambulation.  [] (90540) Provided verbal/tactile cueing for activities related to improving balance, coordination, kinesthetic sense, posture, motor skill, proprioception  to assist with LE, proximal hip, and core control in self care, mobility, lifting, ambulation and eccentric single leg control.      NMR and Therapeutic Activities:    [] (09525 or 46217) Provided verbal/tactile cueing for activities related to improving balance, coordination, kinesthetic sense, posture, motor skill, proprioception and motor activation to allow for proper function of core, proximal hip and LE with self care and ADLs  [] (42666) Gait Re-education- Provided training and instruction to the patient for proper LE, core and proximal hip recruitment and positioning and eccentric body weight control with ambulation re-education including up and down stairs     Home Exercise Program:    [x] (13531) Reviewed/Progressed HEP activities related to strengthening, flexibility, endurance, ROM of core, proximal hip and LE for functional self-care, mobility, lifting and ambulation/stair navigation   [] (12343)Reviewed/Progressed HEP activities related to improving balance, coordination, kinesthetic sense, posture, motor skill, proprioception of core, proximal hip and LE for self care, mobility, lifting, and ambulation/stair navigation      Manual Treatments:  PROM / STM / Oscillations-Mobs:  G-I, II, III, IV (PA's, Inf., Post.)  [] (81045) Provided manual therapy to mobilize LE, proximal hip and/or LS spine soft tissue/joints for the purpose of modulating pain, promoting relaxation,  increasing ROM, reducing/eliminating soft tissue swelling/inflammation/restriction, improving soft tissue extensibility and allowing for proper ROM for normal function with self care, mobility, lifting and ambulation. Modalities:  15' vaso    Charges:  Timed Code Treatment Minutes: 22   Total Treatment Minutes: 22     [] EVAL  [] ZJ(09955) x      [] IONTO  [x] NMR (81474) x      [] VASO  [] Manual (51220) x  1    [] Other:  [] TA x       [] Mech Traction (30381)  [] ES(attended) (39339)      [] ES (un) (19457):     GOALS:  Patient stated goal: \"To be able to walk better\"      Therapist goals for Patient:   Short Term Goals: To be achieved in: 2 weeks  1. Independent in HEP and progression per patient tolerance, in order to prevent re-injury. 2. Patient will have a decrease in pain to facilitate improvement in movement, function, and ADLs as indicated by Functional Deficits.     Long Term Goals: To be achieved in: 8-10 weeks  1. Disability index score of 40% or less for the LEFS to assist with reaching prior level of function. 2. Patient will demonstrate increased AROM to WNL for L ankle to allow for proper joint functioning as indicated by patients Functional Deficits. 3. Patient will demonstrate an increase in Strength to good proximal hip strength and control, within 5lb HHD in LE to allow for proper functional mobility as indicated by patients Functional Deficits. 4. Patient will return to walking up and down 1 flight of stairs without increased symptoms or restriction in order to return to normal work duties. 5. Patient will be able to walk for 30 minutes without increased symptoms or restriction. Progression Towards Functional goals:  [x] Patient is progressing as expected towards functional goals listed. [] Progression is slowed due to complexities listed.   [] Progression has been slowed due to co-morbidities.   [] Plan just implemented, too soon to assess goals progression  [] Other:     ASSESSMENT:  See eval    Treatment/Activity Tolerance:  [x] Patient tolerated treatment well [] Patient limited by fatique  [] Patient limited by pain  [] Patient limited by other medical complications  [] Other:     Prognosis: [x] Good [] Fair  [] Poor    Patient Requires Follow-up: [x] Yes  [] No    PLAN: See eval  [] Continue per plan of care [] Alter current plan (see comments)  [x] Plan of care initiated [] Hold pending MD visit [] Discharge    Electronically signed by: Guicho Romero PT

## 2018-08-17 ENCOUNTER — HOSPITAL ENCOUNTER (OUTPATIENT)
Dept: PHYSICAL THERAPY | Age: 63
Discharge: HOME | End: 2018-08-18
Admitting: ORTHOPAEDIC SURGERY

## 2018-08-17 NOTE — FLOWSHEET NOTE
[facility] [address], [facility]    Physical Therapy Daily Treatment Note  Date:  2018    Patient Name:  Ramiro Lozano    :  1955  MRN: [id number]  Restrictions/Precautions:    Medical/Treatment Diagnosis Information:  · Diagnosis: Left ankle pain (M25.572), s/p ORIF of medial and lateral malleolus (Z96.7) 18  · Treatment Diagnosis: Left ankle pain   Insurance/Certification information:  PT Insurance Information: Self pay  Physician Information:  Referring Practitioner: Dr. Teressa Hernandez of care signed (Y/N):     Date of Patient follow up with Physician:     G-Code (if applicable):      Date G-Code Applied:         Progress Note: [x]  Yes  []  No  Next due by: Visit #10       Latex Allergy:  [x]NO      []YES  Preferred Language for Healthcare:   [x]English       []other:    Visit # Insurance Allowable   5 Self-pay     Pain level:  2/10     SUBJECTIVE:  Pt reports that her ankle has been feeling better. Pt states that her ankle still feels stiff and sore on the top, but feels a little better every day. Pt states that she has been wearing gym shoe around the house, but wearing boot outside of the house up to this point.         OBJECTIVE: See eval  Observation:   Test measurements:      RESTRICTIONS/PRECAUTIONS: S/p R ankle ORIF medial & lateral malleolus 18     Exercises/Interventions:     Therapeutic Ex Resistance Sets/sec Reps Notes   Bike  6'            SLR flex/abd  2 10    SAQ       Clam ABD       Hip Ext /table       Bosu fwd/side lunge       Slide Lunge       Leg Press Ecc 0-       Cybex HS curl       TKE       Glute side walks       TM push  3'     Standing calf stretch  3'     Ankle AROM For proprioception and control 2 10 DF/PF, IV/EV   Ankle circles For proprioception and control 2 10 CW, CCW   Ankle ABC's  1 2x    Towel scrunches  2 10    Ankle Tband Blue 1 20    BAPS board For proprioception and control 1 20 DF/PF, and ambulation/stair navigation      Manual Treatments:  PROM / STM / Oscillations-Mobs:  G-I, II, III, IV (PA's, Inf., Post.)  [] (27846) Provided manual therapy to mobilize LE, proximal hip and/or LS spine soft tissue/joints for the purpose of modulating pain, promoting relaxation,  increasing ROM, reducing/eliminating soft tissue swelling/inflammation/restriction, improving soft tissue extensibility and allowing for proper ROM for normal function with self care, mobility, lifting and ambulation. Modalities:  15' vaso    Charges:  Timed Code Treatment Minutes: 22   Total Treatment Minutes: 22     [] EVAL  [] OS(22910) x      [] IONTO  [x] NMR (61355) x      [] VASO  [] Manual (67457) x  1    [] Other:  [] TA x       [] Mech Traction (80466)  [] ES(attended) (80605)      [] ES (un) (42390):     GOALS:  Patient stated goal: \"To be able to walk better\"      Therapist goals for Patient:   Short Term Goals: To be achieved in: 2 weeks  1. Independent in HEP and progression per patient tolerance, in order to prevent re-injury. 2. Patient will have a decrease in pain to facilitate improvement in movement, function, and ADLs as indicated by Functional Deficits.     Long Term Goals: To be achieved in: 8-10 weeks  1. Disability index score of 40% or less for the LEFS to assist with reaching prior level of function. 2. Patient will demonstrate increased AROM to WNL for L ankle to allow for proper joint functioning as indicated by patients Functional Deficits. 3. Patient will demonstrate an increase in Strength to good proximal hip strength and control, within 5lb HHD in LE to allow for proper functional mobility as indicated by patients Functional Deficits. 4. Patient will return to walking up and down 1 flight of stairs without increased symptoms or restriction in order to return to normal work duties. 5. Patient will be able to walk for 30 minutes without increased symptoms or restriction.         Progression

## 2018-08-23 ENCOUNTER — HOSPITAL ENCOUNTER (OUTPATIENT)
Dept: PHYSICAL THERAPY | Age: 63
Discharge: HOME | End: 2018-08-24
Admitting: ORTHOPAEDIC SURGERY

## 2018-08-23 NOTE — FLOWSHEET NOTE
[place] [facility]    Physical Therapy Daily Treatment Note  Date:  2018    Patient Name:  Robert Herman    :  1955  MRN: [id number]  Restrictions/Precautions:    Medical/Treatment Diagnosis Information:  · Diagnosis: Left ankle pain (M25.572), s/p ORIF of medial and lateral malleolus (Z96.7) 18  · Treatment Diagnosis: Left ankle pain   Insurance/Certification information:  PT Insurance Information: Self pay  Physician Information:  Referring Practitioner: Dr. Sonja Luo of care signed (Y/N):     Date of Patient follow up with Physician:     G-Code (if applicable):      Date G-Code Applied:         Progress Note: [x]  Yes  []  No  Next due by: Visit #10       Latex Allergy:  [x]NO      []YES  Preferred Language for Healthcare:   [x]English       []other:    Visit # Insurance Allowable   6 Self-pay     Pain level:  1/10     SUBJECTIVE:  Pt reports that her ankle has been sore from walking out of the boot more, but feels better to move it as well. Pt states that she has been wearing boot when her ankle is sore and for longer distances, but otherwise wearing gym shoes.        OBJECTIVE: See eval  Observation:   Test measurements:  18: L ankle AROM: DF = 4, PF = 30, IV = 20, EV = 15      RESTRICTIONS/PRECAUTIONS: S/p R ankle ORIF medial & lateral malleolus 18     Exercises/Interventions:     Therapeutic Ex Resistance Sets/sec Reps Notes   Bike  6'            SLR flex/abd  2 10    SAQ       Clam ABD       Hip Ext /table       Bosu fwd/side lunge       Slide Lunge       Leg Press Ecc 0-       Cybex HS curl       TKE       Glute side walks       TM push  3'     Standing calf stretch  3'     Ankle AROM For proprioception and control 2 10 DF/PF, IV/EV   Ankle circles For proprioception and control 2 10 CW, CCW   Ankle ABC's  1 2x    Towel scrunches  2 10    Ankle Tband Blue 1 20    BAPS board For proprioception and control 1 20 DF/PF, IV/EV, CW, CCW   BAPS IV/EV iso's 3# 30\" 3    Standing HR  2 10    Fwd step ups 6\" 2 10           Manual Intervention       Knee mobs/PROM       Tib/Fem Mobs       Patella Mobs       Ankle mobs/PROM 5'                           Nepalese/Biofeedback 10/10       G. Med activaiton/sidelying       G. Max Activation/prone       Hip Ext full ROM G. Activation       Bosu Bal and Prop- G Med       Single leg stance/Balance/Prop  15\" 4x    Bosu Retro G. Med act                  Therapeutic Exercise and NMR EXR  [x] (13214) Provided verbal/tactile cueing for activities related to strengthening, flexibility, endurance, ROM for improvements in LE, proximal hip, and core control with self care, mobility, lifting, ambulation.  [] (74193) Provided verbal/tactile cueing for activities related to improving balance, coordination, kinesthetic sense, posture, motor skill, proprioception  to assist with LE, proximal hip, and core control in self care, mobility, lifting, ambulation and eccentric single leg control.      NMR and Therapeutic Activities:    [] (82232 or 64779) Provided verbal/tactile cueing for activities related to improving balance, coordination, kinesthetic sense, posture, motor skill, proprioception and motor activation to allow for proper function of core, proximal hip and LE with self care and ADLs  [] (39200) Gait Re-education- Provided training and instruction to the patient for proper LE, core and proximal hip recruitment and positioning and eccentric body weight control with ambulation re-education including up and down stairs     Home Exercise Program:    [x] (58085) Reviewed/Progressed HEP activities related to strengthening, flexibility, endurance, ROM of core, proximal hip and LE for functional self-care, mobility, lifting and ambulation/stair navigation   [] (70388)Reviewed/Progressed HEP activities related to improving balance, coordination, kinesthetic sense, posture, motor skill,

## 2018-08-28 ENCOUNTER — OFFICE VISIT (OUTPATIENT)
Dept: ORTHOPEDIC SURGERY | Age: 63
End: 2018-08-28

## 2018-08-28 VITALS
HEIGHT: 64 IN | BODY MASS INDEX: 28.85 KG/M2 | WEIGHT: 169 LBS | HEART RATE: 82 BPM | DIASTOLIC BLOOD PRESSURE: 78 MMHG | SYSTOLIC BLOOD PRESSURE: 121 MMHG

## 2018-08-28 DIAGNOSIS — S82.892D CLOSED FRACTURE OF LEFT ANKLE WITH ROUTINE HEALING, SUBSEQUENT ENCOUNTER: ICD-10-CM

## 2018-08-28 DIAGNOSIS — M25.572 LEFT ANKLE PAIN, UNSPECIFIED CHRONICITY: Primary | ICD-10-CM

## 2018-08-28 PROCEDURE — 99213 OFFICE O/P EST LOW 20 MIN: CPT | Performed by: ORTHOPAEDIC SURGERY

## 2018-08-28 NOTE — PROGRESS NOTES
Date:  2018    Name:  Timo Gilmore  Address:  [address] [place] [address] [address]    :  1955      Age:   66 y.o.    SSN:  [id number]      Medical Record Number:  [id number]    Reason for Visit:    Chief Complaint    Follow-up (lt ankle, fx)      History of Present Illness:  Timo Gilmore is a 66 y.o. female who follows up with us today regarding her left ankle. She is over 3 months status post a left ankle open reduction internal fixation procedure. Doing well. Ambulating with a cane. Pain Assessment  Location of Pain: Ankle  Location Modifiers: Left  Severity of Pain: 2  Quality of Pain: Dull, Aching  Frequency of Pain: Intermittent  Limiting Behavior: Some  Result of Injury: No  Work-Related Injury: No  Are there other pain locations you wish to document?: No    Review of Systems:  A 14 point review of systems available in the scanned medical record as documented by the patient. The review is negative with the exception of those things mentioned in the History of Present Illness and Past Medical History. Past History:  Past Medical History:   Diagnosis Date    Closed left ankle fracture     Thyroid disease      Past Surgical History:   Procedure Laterality Date    ANKLE FRACTURE SURGERY Left 2018    LEFT ORIF OF BI-MALLEOLAR ANKLE FRACTURE    TONSILLECTOMY       Current Outpatient Prescriptions on File Prior to Visit   Medication Sig Dispense Refill    Thyroid (LEVOTHYROXINE-LIOTHYRONINE PO) Take 1 tablet by mouth daily        No current facility-administered medications on file prior to visit. Social History     Social History    Marital status:      Spouse name: N/A    Number of children: N/A    Years of education: N/A     Occupational History    Not on file.      Social History Main Topics    Smoking status: Never Smoker    Smokeless tobacco: Never Used    Alcohol use No    Drug use: No    Sexual activity: Not on file     Other Topics Function:  [x] No gross motor weakness of hip [x] No gross motor weakness of knee  [x] No gross motor weakness of ankle    [x] No gross motor weakness of great toe    [x] Motor strength: 5/5 L4-S1    Neurologic:   [x] Sensation to light touch intact  [x] Coordination / proprioception intact  Motor function intact L2-S1    Circulation:  [x] The limb is warm and well perfused. [x] Capillary refill is intact. [] Edema:  [x] none  [] mild  [] moderate  [] severe     Negative Homans sign - no calf tenderness      Radiographic:  3 views of the left ankle were taken in the office today. X-rays of joint mortise is well preserved. Hardware is in excellent position. Fracture healing well       Assessment :  72-year-old woman with osteopenia who presents 3.5 months status post left ankle open reduction internal fixation. Doing well. Impression:  Encounter Diagnoses   Name Primary?  Left ankle pain, unspecified chronicity Yes    Closed fracture of left ankle with routine healing, subsequent encounter          Treatment Plan: We will have her continue with weightbearing. Continue to be weightbearing as tolerated. I will see her back in 2 months. All questions were answered. Edmundo Mccabe MD  Orthopaedic Surgeon, Sports Medicine  Knee and Shoulder Surgery I Hip Arthroscopy I Joint Preservation  [address] [facility]    Date:    8/28/2018    This dictation was performed with a verbal recognition program [facility]) and it was checked for errors. It is possible that there are still dictated errors within this office note. If so, please bring any errors to my attention for an addendum. All efforts were made to ensure that this office note is accurate.

## 2018-08-30 ENCOUNTER — HOSPITAL ENCOUNTER (OUTPATIENT)
Dept: PHYSICAL THERAPY | Age: 63
Discharge: HOME | End: 2018-08-31
Admitting: ORTHOPAEDIC SURGERY

## 2018-09-01 ENCOUNTER — HOSPITAL ENCOUNTER (OUTPATIENT)
Dept: PHYSICAL THERAPY | Age: 63
Discharge: HOME | End: 2018-09-01
Attending: ORTHOPAEDIC SURGERY | Admitting: ORTHOPAEDIC SURGERY

## 2018-09-06 ENCOUNTER — HOSPITAL ENCOUNTER (OUTPATIENT)
Dept: PHYSICAL THERAPY | Age: 63
Discharge: HOME | End: 2018-09-07
Admitting: ORTHOPAEDIC SURGERY

## 2018-09-06 NOTE — FLOWSHEET NOTE
restriction in order to return to normal work duties. 5. Patient will be able to walk for 30 minutes without increased symptoms or restriction. Progression Towards Functional goals:  [x] Patient is progressing as expected towards functional goals listed. [] Progression is slowed due to complexities listed. [] Progression has been slowed due to co-morbidities. [] Plan just implemented, too soon to assess goals progression  [] Other:     ASSESSMENT:  Improved talocrural mobility noted today. Pt demonstrated improved ROM with standing HR, but continued proprioception deficits noted. Pt had difficulty with step up and overs due to decreased eccentric strength and decreased ankle TC/DF ROM. Increased swelling over  Ankle joint with decreased DF ROM and decreased strength with PF. Denied pain with progressions but was very fatigued with tandem balance and unable to perform SLS as a result    Treatment/Activity Tolerance:  [x] Patient tolerated treatment well [] Patient limited by fatique  [] Patient limited by pain  [] Patient limited by other medical complications  [] Other:     Prognosis: [x] Good [] Fair  [] Poor    Patient Requires Follow-up: [x] Yes  [] No    PLAN: Progress pt as able and plans to progress at  [employer] for additional workouts and to progress strength. Increase HEP compliance to ROM and strength while wearing stocking to control swelling. 1x week/ PT sessions due to paying privately  [x] Continue per plan of care [] Alter current plan (see comments)  [] Plan of care initiated [] Hold pending MD visit [] Discharge    Electronically signed by: Isis Childs PTA, [id number]

## 2018-09-11 ENCOUNTER — HOSPITAL ENCOUNTER (OUTPATIENT)
Dept: PHYSICAL THERAPY | Age: 63
Discharge: HOME | End: 2018-09-12
Admitting: ORTHOPAEDIC SURGERY

## 2018-09-11 NOTE — FLOWSHEET NOTE
4. Patient will return to walking up and down 1 flight of stairs without increased symptoms or restriction in order to return to normal work duties. 5. Patient will be able to walk for 30 minutes without increased symptoms or restriction. Progression Towards Functional goals:  [x] Patient is progressing as expected towards functional goals listed. [] Progression is slowed due to complexities listed. [] Progression has been slowed due to co-morbidities. [] Plan just implemented, too soon to assess goals progression  [] Other:     ASSESSMENT:  Improved talocrural mobility noted today. Pt demonstrated improved ROM with standing HR, but continued proprioception deficits noted. Pt had difficulty with step up and overs due to decreased eccentric strength and decreased ankle TC/DF ROM. Increased swelling over  Ankle joint with decreased DF ROM and decreased strength with PF. Denied pain with progressions but was very fatigued with tandem balance and unable to perform SLS as a result    Treatment/Activity Tolerance:  [x] Patient tolerated treatment well [] Patient limited by fatique  [] Patient limited by pain  [] Patient limited by other medical complications  [] Other:     Prognosis: [x] Good [] Fair  [] Poor    Patient Requires Follow-up: [x] Yes  [] No    PLAN: Progress pt as able and plans to progress at  [employer] for additional workouts and to progress strength. Increase HEP compliance to ROM and strength while wearing stocking to control swelling. 1x week/ PT sessions due to paying privately  [x] Continue per plan of care [] Alter current plan (see comments)  [] Plan of care initiated [] Hold pending MD visit [] Discharge    Electronically signed by: Fred Bright PTA, [id number]

## 2018-09-16 NOTE — PROGRESS NOTES
on file     No family history on file. Current Medications:    Current Outpatient Prescriptions   Medication Sig Dispense Refill    Thyroid (LEVOTHYROXINE-LIOTHYRONINE PO) Take 1 tablet by mouth daily        No current facility-administered medications for this visit. Allergies:  No Known Allergies    Physical Exam:  Vitals:    07/09/18 1315   BP: 118/77   Pulse: 80     General: Best Davis is a 66y.o. year old female who is sitting comfortably in our office in no acute distress. Alert and oriented. Objective:   Physical Exam  Vital Signs:  /77   Pulse 80   Ht 5' 4\" (1.626 m)   Wt 169 lb (76.7 kg)   BMI 29.01 kg/m²     Constitution:  Generally, Best Davis is [x] alert, [x] appears stated age, and [x] in no distress. Her general body habitus is [] Cachectic  [] Thin  [x] Normal  [] Obese  [] Morbidly Obese    Head: [x] Normocephalic  Eyes: [x] Extra-occular muscles intact  Left Ear: [x] External Ear normal   Right Ear: [x] External Ear normal   Nose: [x] Normal  Mouth: [x] Oral mucosa moist  [x] No perioral lesions    Pulmonary: [x] Respirations unlabored and regular  Skin: [x] Warm [x] Well perfused     Psychiatric:   [x] Good judgement and insight  [x] Oriented to [x] person, [x] place, and [x] time  [x] Mood appropriate for circumstances    Gait:   Gait is [] Normal  [x] Impaired   Assistive Device: [x] None  [] Knee Brace  [] Cane  [] Crutches   [] Abdiel Cosmo   [] Wheelchair  [] Other      Left ankle ORTHOPAEDIC  EXAM:   Inspection:  [x] Skin intact without abrasion, lacerations or rashes  [x] Leg lengths equal    Incision well-healed.   No deformity     Range of Motion:  [x] No flexion contracture         [] Deferred: acute injury/post-surgery/pain  [] Flexion contracture    Full range of motion without ankle    Palpation:   Nontender throughout    Ligamentous and Provocative testing:  Negative anterior drawer    Motor Function:  [x] No gross motor weakness of hip [x] No gross motor

## 2018-09-20 ENCOUNTER — HOSPITAL ENCOUNTER (OUTPATIENT)
Dept: PHYSICAL THERAPY | Age: 63
Discharge: HOME | End: 2018-09-21
Admitting: ORTHOPAEDIC SURGERY

## 2018-09-20 NOTE — FLOWSHEET NOTE
proprioception and control 1 30x DF/PF, IV/EV, CW, CCW   BAPS IV/EV iso's 5#  0 30\" 2x  25x    Standing HR  Seated TR/HR   3 10x    Fwd step ups HOLD   Step up and overs 4\" 1 10           Manual Intervention       Knee mobs/PROM       Tib/Fem Mobs       Patella Mobs       Ankle mobs/PROM 5'   TC mobs grade 1/2  Prone NPV   STM   10'   Over medial/ lateral ankle incisions   Manual gastroc stretching              Macedonian/Biofeedback 10/10       G. Med activaiton/sidelying       G. Max Activation/prone       Hip Ext full ROM G. Activation       Bosu Bal and Prop- G Med       Single leg stance/Balance/Prop aeromat 20\" 3x    Bosu Retro G. Med act       Tandem balance aeromat 30\" 2x        Therapeutic Exercise and NMR EXR  [x] (54282) Provided verbal/tactile cueing for activities related to strengthening, flexibility, endurance, ROM for improvements in LE, proximal hip, and core control with self care, mobility, lifting, ambulation.  [] (57945) Provided verbal/tactile cueing for activities related to improving balance, coordination, kinesthetic sense, posture, motor skill, proprioception  to assist with LE, proximal hip, and core control in self care, mobility, lifting, ambulation and eccentric single leg control.      NMR and Therapeutic Activities:    [] (30851 or 59388) Provided verbal/tactile cueing for activities related to improving balance, coordination, kinesthetic sense, posture, motor skill, proprioception and motor activation to allow for proper function of core, proximal hip and LE with self care and ADLs  [] (90367) Gait Re-education- Provided training and instruction to the patient for proper LE, core and proximal hip recruitment and positioning and eccentric body weight control with ambulation re-education including up and down stairs     Home Exercise Program:    [x] (70868) Reviewed/Progressed HEP activities related to strengthening, flexibility, endurance, ROM of core, proximal hip and LE for functional

## 2018-10-04 ENCOUNTER — HOSPITAL ENCOUNTER (OUTPATIENT)
Dept: PHYSICAL THERAPY | Age: 63
Setting detail: THERAPIES SERIES
Discharge: HOME | End: 2018-10-04

## 2018-10-04 PROCEDURE — 97112 NEUROMUSCULAR REEDUCATION: CPT | Performed by: SPECIALIST/TECHNOLOGIST

## 2018-10-04 NOTE — FLOWSHEET NOTE
[facility]    Physical Therapy Daily Treatment Note  Date:  10/4/2018    Patient Name:  William Murillo    :  1955  MRN: [id number]  Restrictions/Precautions:    Medical/Treatment Diagnosis Information:  · Diagnosis: Left ankle pain (M25.572), s/p ORIF of medial and lateral malleolus (Z96.7) 18  · Treatment Diagnosis: Left ankle pain   Insurance/Certification information:  PT Insurance Information: Self pay([employer])  Physician Information:  Referring Practitioner: Dr. Jimmy Skinner of care signed (Y/N):     Date of Patient follow up with Physician:  Eric Etienne  10/30    G-Code (if applicable):      Date G-Code Applied:         Progress Note: [x]  Yes  []  No  Next due by: Visit #10       Latex Allergy:  [x]NO      []YES  Preferred Language for Healthcare:   [x]English       []other:    Visit # Insurance Allowable   11 Self-pay     Pain level:  1/10     SUBJECTIVE:  Reports walking well and ankle has good/bad days. Descending stairs harder. Walking challenged but improved tolerance to distance. No wts for HEP.   Eric Etienne 10/30  OBJECTIVE:  Observation:   Test measurements:  18: L ankle AROM: DF = 4, PF = 30, IV = 20, EV = 15      RESTRICTIONS/PRECAUTIONS: S/p R ankle ORIF medial & lateral malleolus 18     Exercises/Interventions:  25'  Therapeutic Ex/NMR Resistance Sets/sec Reps Notes   Bike  6'            SLR flex/abd     SAQ       Clam ABD       Hip Ext /table       Bosu fwd/side lunge       Slide Lunge       Leg Press B  SL  HR SL 70#  50#   50# B 1 25xea    Cybex HS curl       Soleus press 50#  35x    Glute side walks       TM push       Standing calf stretch x2 positions  30\" 3xa    Ankle AROM DF/PF, IV/EV   Ankle circles CW, CCW   Ankle ABC's HEP 1 2x    Towel scrunches     Ankle Tband  Ankle pre's    5#  2# Inv/ EV   1 30ea    25x ea    BAPS board For proprioception and control 1 30x DF/PF, IV/EV, CW, CCW   BAPS IV/EV HEP activities related to improving balance, coordination, kinesthetic sense, posture, motor skill, proprioception of core, proximal hip and LE for self care, mobility, lifting, and ambulation/stair navigation      Manual Treatments:  PROM / STM / Oscillations-Mobs:  G-I, II, III, IV (PA's, Inf., Post.)  [] (09102) Provided manual therapy to mobilize LE, proximal hip and/or LS spine soft tissue/joints for the purpose of modulating pain, promoting relaxation,  increasing ROM, reducing/eliminating soft tissue swelling/inflammation/restriction, improving soft tissue extensibility and allowing for proper ROM for normal function with self care, mobility, lifting and ambulation. Modalities:         Charges:  Timed Code Treatment Minutes: 25   Total Treatment Minutes: 25     [] EVAL  [] HE(07394) x      [] IONTO  [x] NMR (93659) x  1   [] VASO  [] Manual (32140) x       [] Other:  [] TA x       [] Mech Traction (61762)  [] ES(attended) (29662)      [] ES (un) (52119):     GOALS:  Patient stated goal: \"To be able to walk better\"      Therapist goals for Patient:   Short Term Goals: To be achieved in: 2 weeks  1. Independent in HEP and progression per patient tolerance, in order to prevent re-injury. 2. Patient will have a decrease in pain to facilitate improvement in movement, function, and ADLs as indicated by Functional Deficits.     Long Term Goals: To be achieved in: 8-10 weeks  1. Disability index score of 40% or less for the LEFS to assist with reaching prior level of function. 2. Patient will demonstrate increased AROM to WNL for L ankle to allow for proper joint functioning as indicated by patients Functional Deficits. 3. Patient will demonstrate an increase in Strength to good proximal hip strength and control, within 5lb HHD in LE to allow for proper functional mobility as indicated by patients Functional Deficits.    4. Patient will return to walking up and down 1 flight of stairs without increased

## 2018-10-11 ENCOUNTER — HOSPITAL ENCOUNTER (OUTPATIENT)
Dept: PHYSICAL THERAPY | Age: 63
Setting detail: THERAPIES SERIES
Discharge: HOME | End: 2018-10-11

## 2018-10-11 PROCEDURE — 97112 NEUROMUSCULAR REEDUCATION: CPT | Performed by: SPECIALIST/TECHNOLOGIST

## 2018-10-11 NOTE — FLOWSHEET NOTE
[facility]    Physical Therapy Daily Treatment Note  Date:  10/11/2018    Patient Name:  Jessica Posey    :  1955  MRN: [id number]  Restrictions/Precautions:    Medical/Treatment Diagnosis Information:  · Diagnosis: Left ankle pain (M25.572), s/p ORIF of medial and lateral malleolus (Z96.7) 18  · Treatment Diagnosis: Left ankle pain   Insurance/Certification information:  PT Insurance Information: Self pay([employer])  Physician Information:  Referring Practitioner: Dr. Marylene Otter of care signed (Y/N):     Date of Patient follow up with Physician:  Castro Liner  10/30    G-Code (if applicable):      Date G-Code Applied:         Progress Note: [x]  Yes  []  No  Next due by: Visit #10       Latex Allergy:  [x]NO      []YES  Preferred Language for Healthcare:   [x]English       []other:    Visit # Insurance Allowable   11 Self-pay     Pain level:  1/10     SUBJECTIVE:  Having some relief recently with her foot with less stiffness. Pt has purchased wts to progress strength/ endurance Castro Liner 10/30  OBJECTIVE:  Observation:   Test measurements:  18: L ankle AROM: DF = 4, PF = 30, IV = 20, EV = 15    10/11/18 38% LEFS. N gait entering clinic. Descending stair performance improved.      RESTRICTIONS/PRECAUTIONS: S/p R ankle ORIF medial & lateral malleolus 18     Exercises/Interventions:  25'  Therapeutic Ex/NMR Resistance Sets/sec Reps Notes   Bike  6'            SLR flex/abd     SAQ       Clam ABD       Hip Ext /table       Bosu fwd/side lunge       Slide Lunge       Leg Press B  SL  HR SL 70#  50#   50# B 1 25xea    Cybex HS curl       Soleus press  B  SL 50#  40#  25x    Glute side walks       TM push       Standing calf stretch x2 positions  30\" 3xa    Ankle AROM DF/PF, IV/EV   Ankle circles CW, CCW   Ankle ABC's HEP 1 2x    Towel scrunches     Ankle Tband  Ankle pre's    5#   1 30ea    25x ea    BAPS board DF/PF, IV/EV, CW, CCW   BAPS IV/EV iso's    Standing HR off step  Seated TR/HR   2 10x    Fwd step ups HOLD   Step up and overs 6\" 1 27x           Manual Intervention       Knee mobs/PROM       Tib/Fem Mobs       Patella Mobs       Ankle mobs/PROM    TC mobs grade 1/2  Prone NPV         Over medial/ lateral ankle incisions   Manual gastroc stretching              Croatian/Biofeedback 10/10       G. Med activaiton/sidelying       G. Max Activation/prone       Hip Ext full ROM G. Activation       Bosu Bal and Prop- G Med       Single leg stance/Balance/Prop Aeromat/ bosu 20\" 3x    Bosu Retro G. Med act       Tandem balance        Therapeutic Exercise and NMR EXR  [x] (44103) Provided verbal/tactile cueing for activities related to strengthening, flexibility, endurance, ROM for improvements in LE, proximal hip, and core control with self care, mobility, lifting, ambulation.  [] (15997) Provided verbal/tactile cueing for activities related to improving balance, coordination, kinesthetic sense, posture, motor skill, proprioception  to assist with LE, proximal hip, and core control in self care, mobility, lifting, ambulation and eccentric single leg control.      NMR and Therapeutic Activities:    [] (94706 or 47817) Provided verbal/tactile cueing for activities related to improving balance, coordination, kinesthetic sense, posture, motor skill, proprioception and motor activation to allow for proper function of core, proximal hip and LE with self care and ADLs  [] (14389) Gait Re-education- Provided training and instruction to the patient for proper LE, core and proximal hip recruitment and positioning and eccentric body weight control with ambulation re-education including up and down stairs     Home Exercise Program:    [x] (81838) Reviewed/Progressed HEP activities related to strengthening, flexibility, endurance, ROM of core, proximal hip and LE for functional self-care, mobility, lifting and ambulation/stair navigation   []

## 2018-10-18 ENCOUNTER — APPOINTMENT (OUTPATIENT)
Dept: PHYSICAL THERAPY | Age: 63
End: 2018-10-18

## 2018-10-25 ENCOUNTER — HOSPITAL ENCOUNTER (OUTPATIENT)
Dept: PHYSICAL THERAPY | Age: 63
Setting detail: THERAPIES SERIES
Discharge: HOME | End: 2018-10-25

## 2018-10-25 PROCEDURE — 97112 NEUROMUSCULAR REEDUCATION: CPT | Performed by: SPECIALIST/TECHNOLOGIST

## 2018-10-30 ENCOUNTER — OFFICE VISIT (OUTPATIENT)
Dept: ORTHOPEDIC SURGERY | Age: 63
End: 2018-10-30
Payer: COMMERCIAL

## 2018-10-30 VITALS
SYSTOLIC BLOOD PRESSURE: 126 MMHG | BODY MASS INDEX: 28.85 KG/M2 | HEART RATE: 89 BPM | WEIGHT: 169 LBS | DIASTOLIC BLOOD PRESSURE: 81 MMHG | HEIGHT: 64 IN

## 2018-10-30 DIAGNOSIS — Z98.890 STATUS POST SURGICAL MANIPULATION OF ANKLE JOINT: ICD-10-CM

## 2018-10-30 DIAGNOSIS — M25.572 LEFT ANKLE PAIN, UNSPECIFIED CHRONICITY: Primary | ICD-10-CM

## 2018-10-30 PROCEDURE — 99213 OFFICE O/P EST LOW 20 MIN: CPT | Performed by: ORTHOPAEDIC SURGERY

## 2018-10-31 NOTE — PROGRESS NOTES
Date:  10/30/2018  Name:  Erasmo Go  Address:  [address] [place] [address] [address]    :  1955      Age:   66 y.o.    SSN:  [id number]      Medical Record Number:  [id number]    Reason for Visit:    Chief Complaint    Follow-up (lt ankle)      History of Present Illness:  Erasmo Go is a 66 y.o. female who follows up today regarding her left ankle. She has no pain    Pain Assessment  Location of Pain: Ankle  Location Modifiers: Left  Severity of Pain: 1  Frequency of Pain: Rarely  Limiting Behavior: No  Result of Injury: No  Work-Related Injury: No  Are there other pain locations you wish to document?: No    Review of Systems:  A 14 point review of systems available in the scanned medical record as documented by the patient. The review is negative with the exception of those things mentioned in the History of Present Illness and Past Medical History. Past History:  Past Medical History:   Diagnosis Date    Closed left ankle fracture     Thyroid disease      Past Surgical History:   Procedure Laterality Date    ANKLE FRACTURE SURGERY Left 2018    LEFT ORIF OF BI-MALLEOLAR ANKLE FRACTURE    TONSILLECTOMY       Current Outpatient Prescriptions on File Prior to Visit   Medication Sig Dispense Refill    Thyroid (LEVOTHYROXINE-LIOTHYRONINE PO) Take 1 tablet by mouth daily        No current facility-administered medications on file prior to visit. Social History     Social History    Marital status:      Spouse name: N/A    Number of children: N/A    Years of education: N/A     Occupational History    Not on file. Social History Main Topics    Smoking status: Never Smoker    Smokeless tobacco: Never Used    Alcohol use No    Drug use: No    Sexual activity: Not on file     Other Topics Concern    Not on file     Social History Narrative    No narrative on file     No family history on file.     Current Medications:    Current Outpatient Prescriptions Medication Sig Dispense Refill    Thyroid (LEVOTHYROXINE-LIOTHYRONINE PO) Take 1 tablet by mouth daily        No current facility-administered medications for this visit. Allergies:  No Known Allergies    Physical Exam:  Vitals:    10/30/18 1425   BP: 126/81   Pulse: 89     General: Saira Jones is a 66y.o. year old female who is sitting comfortably in our office in no acute distress. Alert and oriented. Objective:   Physical Exam  Vital Signs:  /81   Pulse 89   Ht 5' 4\" (1.626 m)   Wt 169 lb (76.7 kg)   BMI 29.01 kg/m²     Constitution:  Generally, Saira Jones is [x] alert, [x] appears stated age, and [x] in no distress.   Her general body habitus is [] Cachectic  [] Thin  [x] Normal  [] Obese  [] Morbidly Obese    Head: [x] Normocephalic  Eyes: [x] Extra-occular muscles intact  Left Ear: [x] External Ear normal   Right Ear: [x] External Ear normal   Nose: [x] Normal  Mouth: [x] Oral mucosa moist  [x] No perioral lesions    Pulmonary: [x] Respirations unlabored and regular  Skin: [x] Warm [x] Well perfused     Psychiatric:   [x] Good judgement and insight  [x] Oriented to [x] person, [x] place, and [x] time  [x] Mood appropriate for circumstances    Gait:   Gait is [x] Normal  [] Impaired   Assistive Device: [x] None  [] Knee Brace  [] Cane  [] Crutches   [] Rendell Gravel   [] Wheelchair  [] Other      Left ankle ORTHOPAEDIC  EXAM:   Inspection:  [x] Skin intact without abrasion, lacerations or rashes  [x] Leg lengths equal  Incisions well-healed     Range of Motion:  [x] No flexion contracture         [] Deferred: acute injury/post-surgery/pain  [] Flexion contracture    No pain with circumduction of ankle    Patient can plantarflex and dorsiflex left ankle with normal range of motion    Palpation:   Nontender over medial and lateral malleoli area no prominent hardware    Ligamentous and Provocative testing:  Negative anterior drawer    Motor Function:  [x] No gross motor weakness of hip [x]

## 2024-03-12 ENCOUNTER — OFFICE (OUTPATIENT)
Dept: URBAN - METROPOLITAN AREA CLINIC 13 | Facility: CLINIC | Age: 69
End: 2024-03-12

## 2024-03-12 VITALS
HEIGHT: 63 IN | WEIGHT: 111 LBS | DIASTOLIC BLOOD PRESSURE: 70 MMHG | HEART RATE: 85 BPM | SYSTOLIC BLOOD PRESSURE: 120 MMHG

## 2024-03-12 DIAGNOSIS — R74.8 ABNORMAL LEVELS OF OTHER SERUM ENZYMES: ICD-10-CM

## 2024-03-12 DIAGNOSIS — E61.1 IRON DEFICIENCY: ICD-10-CM

## 2024-03-12 DIAGNOSIS — R63.4 ABNORMAL WEIGHT LOSS: ICD-10-CM

## 2024-03-12 PROCEDURE — 99204 OFFICE O/P NEW MOD 45 MIN: CPT | Performed by: INTERNAL MEDICINE

## 2024-04-19 ENCOUNTER — AMBULATORY SURGICAL CENTER (OUTPATIENT)
Dept: URBAN - METROPOLITAN AREA SURGERY 4 | Facility: SURGERY | Age: 69
End: 2024-04-19
Payer: MEDICARE

## 2024-04-19 ENCOUNTER — OFFICE (OUTPATIENT)
Dept: URBAN - METROPOLITAN AREA PATHOLOGY 1 | Facility: PATHOLOGY | Age: 69
End: 2024-04-19
Payer: MEDICARE

## 2024-04-19 ENCOUNTER — AMBULATORY SURGICAL CENTER (OUTPATIENT)
Dept: URBAN - METROPOLITAN AREA SURGERY 4 | Facility: SURGERY | Age: 69
End: 2024-04-19

## 2024-04-19 VITALS
DIASTOLIC BLOOD PRESSURE: 63 MMHG | OXYGEN SATURATION: 97 % | DIASTOLIC BLOOD PRESSURE: 69 MMHG | SYSTOLIC BLOOD PRESSURE: 104 MMHG | OXYGEN SATURATION: 98 % | SYSTOLIC BLOOD PRESSURE: 129 MMHG | OXYGEN SATURATION: 96 % | DIASTOLIC BLOOD PRESSURE: 61 MMHG | SYSTOLIC BLOOD PRESSURE: 110 MMHG | SYSTOLIC BLOOD PRESSURE: 107 MMHG | SYSTOLIC BLOOD PRESSURE: 101 MMHG | OXYGEN SATURATION: 100 % | HEART RATE: 94 BPM | DIASTOLIC BLOOD PRESSURE: 69 MMHG | DIASTOLIC BLOOD PRESSURE: 68 MMHG | DIASTOLIC BLOOD PRESSURE: 61 MMHG | HEART RATE: 88 BPM | OXYGEN SATURATION: 99 % | HEIGHT: 63 IN | TEMPERATURE: 97.1 F | RESPIRATION RATE: 14 BRPM | HEART RATE: 100 BPM | SYSTOLIC BLOOD PRESSURE: 119 MMHG | SYSTOLIC BLOOD PRESSURE: 114 MMHG | HEART RATE: 108 BPM | SYSTOLIC BLOOD PRESSURE: 123 MMHG | SYSTOLIC BLOOD PRESSURE: 123 MMHG | HEART RATE: 84 BPM | HEART RATE: 87 BPM | OXYGEN SATURATION: 97 % | DIASTOLIC BLOOD PRESSURE: 79 MMHG | SYSTOLIC BLOOD PRESSURE: 105 MMHG | HEART RATE: 87 BPM | DIASTOLIC BLOOD PRESSURE: 66 MMHG | HEIGHT: 63 IN | DIASTOLIC BLOOD PRESSURE: 63 MMHG | HEART RATE: 88 BPM | SYSTOLIC BLOOD PRESSURE: 149 MMHG | OXYGEN SATURATION: 98 % | RESPIRATION RATE: 15 BRPM | OXYGEN SATURATION: 94 % | HEART RATE: 100 BPM | DIASTOLIC BLOOD PRESSURE: 67 MMHG | HEART RATE: 85 BPM | RESPIRATION RATE: 16 BRPM | SYSTOLIC BLOOD PRESSURE: 105 MMHG | WEIGHT: 110 LBS | OXYGEN SATURATION: 94 % | HEART RATE: 86 BPM | HEART RATE: 85 BPM | SYSTOLIC BLOOD PRESSURE: 119 MMHG | SYSTOLIC BLOOD PRESSURE: 110 MMHG | DIASTOLIC BLOOD PRESSURE: 66 MMHG | SYSTOLIC BLOOD PRESSURE: 107 MMHG | HEART RATE: 90 BPM | OXYGEN SATURATION: 96 % | DIASTOLIC BLOOD PRESSURE: 68 MMHG | RESPIRATION RATE: 16 BRPM | HEART RATE: 94 BPM | DIASTOLIC BLOOD PRESSURE: 79 MMHG | SYSTOLIC BLOOD PRESSURE: 101 MMHG | SYSTOLIC BLOOD PRESSURE: 149 MMHG | SYSTOLIC BLOOD PRESSURE: 114 MMHG | HEART RATE: 96 BPM | OXYGEN SATURATION: 99 % | WEIGHT: 110 LBS | SYSTOLIC BLOOD PRESSURE: 104 MMHG | HEART RATE: 108 BPM | RESPIRATION RATE: 15 BRPM | HEART RATE: 84 BPM | SYSTOLIC BLOOD PRESSURE: 129 MMHG | DIASTOLIC BLOOD PRESSURE: 67 MMHG | DIASTOLIC BLOOD PRESSURE: 87 MMHG | HEART RATE: 99 BPM | TEMPERATURE: 97.1 F | HEART RATE: 96 BPM | HEART RATE: 86 BPM | OXYGEN SATURATION: 100 % | HEART RATE: 99 BPM | DIASTOLIC BLOOD PRESSURE: 87 MMHG | HEART RATE: 90 BPM | RESPIRATION RATE: 14 BRPM

## 2024-04-19 DIAGNOSIS — K29.30 CHRONIC SUPERFICIAL GASTRITIS WITHOUT BLEEDING: ICD-10-CM

## 2024-04-19 DIAGNOSIS — R63.4 ABNORMAL WEIGHT LOSS: ICD-10-CM

## 2024-04-19 DIAGNOSIS — K63.5 POLYP OF COLON: ICD-10-CM

## 2024-04-19 DIAGNOSIS — E61.1 IRON DEFICIENCY: ICD-10-CM

## 2024-04-19 DIAGNOSIS — K31.89 OTHER DISEASES OF STOMACH AND DUODENUM: ICD-10-CM

## 2024-04-19 DIAGNOSIS — D12.0 BENIGN NEOPLASM OF CECUM: ICD-10-CM

## 2024-04-19 DIAGNOSIS — K90.0 CELIAC DISEASE: ICD-10-CM

## 2024-04-19 PROCEDURE — 45385 COLONOSCOPY W/LESION REMOVAL: CPT | Performed by: INTERNAL MEDICINE

## 2024-04-19 PROCEDURE — 88342 IMHCHEM/IMCYTCHM 1ST ANTB: CPT | Performed by: PATHOLOGY

## 2024-04-19 PROCEDURE — 43239 EGD BIOPSY SINGLE/MULTIPLE: CPT | Performed by: INTERNAL MEDICINE

## 2024-04-19 PROCEDURE — 88305 TISSUE EXAM BY PATHOLOGIST: CPT | Performed by: PATHOLOGY

## 2024-04-19 PROCEDURE — 43239 EGD BIOPSY SINGLE/MULTIPLE: CPT | Mod: 51 | Performed by: INTERNAL MEDICINE

## 2024-04-25 LAB
PDF: PDF REPORT: (no result)
PDF: PDF REPORT: (no result)